# Patient Record
Sex: MALE | Race: WHITE | Employment: OTHER | URBAN - METROPOLITAN AREA
[De-identification: names, ages, dates, MRNs, and addresses within clinical notes are randomized per-mention and may not be internally consistent; named-entity substitution may affect disease eponyms.]

---

## 2022-06-18 ENCOUNTER — HOSPITAL ENCOUNTER (EMERGENCY)
Age: 22
Discharge: HOME | End: 2022-06-18
Attending: EMERGENCY MEDICINE
Payer: OTHER GOVERNMENT

## 2022-06-18 VITALS
TEMPERATURE: 99.5 F | OXYGEN SATURATION: 100 % | DIASTOLIC BLOOD PRESSURE: 81 MMHG | WEIGHT: 150 LBS | HEIGHT: 68 IN | BODY MASS INDEX: 22.73 KG/M2 | SYSTOLIC BLOOD PRESSURE: 130 MMHG | RESPIRATION RATE: 18 BRPM | HEART RATE: 110 BPM

## 2022-06-18 DIAGNOSIS — Z20.822 PERSON UNDER INVESTIGATION FOR COVID-19: ICD-10-CM

## 2022-06-18 DIAGNOSIS — J06.9 VIRAL UPPER RESPIRATORY TRACT INFECTION: Primary | ICD-10-CM

## 2022-06-18 LAB
FLUAV RNA SPEC QL NAA+PROBE: DETECTED
FLUBV RNA SPEC QL NAA+PROBE: NOT DETECTED
S PYO AG THROAT QL: NEGATIVE
SARS-COV-2, COV2: NOT DETECTED

## 2022-06-18 PROCEDURE — 87636 SARSCOV2 & INF A&B AMP PRB: CPT

## 2022-06-18 PROCEDURE — 74011250637 HC RX REV CODE- 250/637: Performed by: PHYSICIAN ASSISTANT

## 2022-06-18 PROCEDURE — 99283 EMERGENCY DEPT VISIT LOW MDM: CPT

## 2022-06-18 PROCEDURE — 87070 CULTURE OTHR SPECIMN AEROBIC: CPT

## 2022-06-18 PROCEDURE — 87880 STREP A ASSAY W/OPTIC: CPT

## 2022-06-18 RX ORDER — IBUPROFEN 600 MG/1
600 TABLET ORAL
Status: COMPLETED | OUTPATIENT
Start: 2022-06-18 | End: 2022-06-18

## 2022-06-18 RX ADMIN — IBUPROFEN 600 MG: 600 TABLET ORAL at 09:54

## 2022-06-18 NOTE — LETTER
[facility] [place]  [facility] EMERGENCY DEPT  [address]  [facility] NEWS [address]  [address] [phone]    Work/School Note    Date: 6/18/2022     To Whom It May concern:    Bridgett Osullivan was evaluated by the following provider(s):  Attending Provider: Helena Johnson MD  Physician Assistant: Kimberly Montes De Oca, [address] virus is suspected. Per the CDC guidelines we recommend home isolation until the following conditions are all met:    1. At least five days have passed since symptoms first appeared (6/23/22)  2. After home isolation for five days, wearing a mask around others for the next five days,  3. At least 24 have passed since last fever without the use of fever-reducing medications and  4.  Symptoms (eg cough, shortness of breath) have improved      Sincerely,          JUAN Centeno

## 2022-06-18 NOTE — ED PROVIDER NOTES
EMERGENCY DEPARTMENT HISTORY AND PHYSICAL EXAM    Date: 6/18/2022  Patient Name: Tricia Hamlin    History of Presenting Illness     Chief Complaint   Patient presents with    Generalized Body Aches    Fever    Cough         History Provided By: Patient    10:20 AM  Tricia Hamlin is a 25 y.o. male who presents to the emergency department C/O fever, chills, body aches, cough, mild sore throat which patient woke up this morning. He states he started feeling \"feverish last night\". No known sick contacts or exposure to COVID-19 but is in the [employer] and is often around other groups of people. He is he received both COVID vaccines over a year ago. His wife is currently pregnant prompting them to come to the ED for evaluation. He did not take any medications prior to arrival.  No other medical problems, non-smoker. Pt denies ear pain, difficulty swallowing, shortness of breath, and any other sxs or complaints. PCP: None        Past History     Past Medical History:  No past medical history on file. Past Surgical History:  No past surgical history on file. Family History:  No family history on file. Social History:  Social History     Tobacco Use    Smoking status: Not on file    Smokeless tobacco: Not on file   Substance Use Topics    Alcohol use: Not on file    Drug use: Not on file       Allergies: Allergies   Allergen Reactions    Other Plant, Animal, Environmental Itching     water         Review of Systems   Review of Systems   Constitutional: Positive for chills and fever. HENT: Positive for sore throat. Negative for ear pain and trouble swallowing. Respiratory: Positive for cough. Negative for shortness of breath. Musculoskeletal: Positive for myalgias. All other systems reviewed and are negative.         Physical Exam     Vitals:    06/18/22 0858   BP: 130/81   Pulse: (!) 134   Resp: 18   Temp: (!) 101.3 °F (38.5 °C)   SpO2: 100%   Weight: 68 kg (150 lb)   Height: 5' 8\" (1.727 m)     Physical Exam  Vital signs and nursing notes reviewed. CONSTITUTIONAL: Alert. Well-appearing; well-nourished; in no apparent distress. HEAD: Normocephalic; atraumatic. EYES: Conjunctiva clear. ENT: TM's normal. External ear normal. Normal nose; no rhinorrhea. Normal pharynx. Tonsils not enlarged without exudate. Moist mucus membranes. NECK: Supple; FROM without difficulty, non-tender; no cervical lymphadenopathy. CV: Mildly tachycardic, regular S1, S2; no murmurs, rubs, or gallops. RESPIRATORY: Normal chest excursion with respiration; breath sounds clear and equal bilaterally; no wheezes, rhonchi, or rales. NEURO: A & O x3. PSYCH:  Mood and affect appropriate. Diagnostic Study Results     Labs -     Recent Results (from the past 12 hour(s))   POC GROUP A STREP    Collection Time: 06/18/22 10:03 AM   Result Value Ref Range    Group A strep (POC) Negative NEG         Radiologic Studies -   No orders to display     CT Results  (Last 48 hours)    None        CXR Results  (Last 48 hours)    None          Medications given in the ED-  Medications   ibuprofen (MOTRIN) tablet 600 mg (600 mg Oral Given 6/18/22 0954)         Medical Decision Making   I am the first provider for this patient. I reviewed the vital signs, available nursing notes, past medical history, past surgical history, family history and social history. Vital Signs-Reviewed the patient's vital signs. Records Reviewed: Nursing Notes      Procedures:  Procedures    ED Course:  10:20 AM   Initial assessment performed. The patients presenting problems have been discussed, and they are in agreement with the care plan formulated and outlined with them. I have encouraged them to ask questions as they arise throughout their visit. Provider Notes (Medical Decision Making): Clara Phillips is a 25 y.o. male presents with URI symptoms onset this morning with associated fever.   Patient medicated in the ED, well-appearing with a normal exam other than a very mild tachycardia. Symptoms are consistent with a viral URI, no signs of secondary bacterial infection that would warrant antibiotics or further work-up at this time. His rapid strep is negative, COVID and flu test pending at this time. No comorbidities to warrant Tamiflu or COVID-specific antivirals, per current CDC guidelines. Recommend antipyretics, plenty of fluids, over-the-counter medications for symptomatic relief discussed as well as return precautions. Work note provided per current [employer]. Diagnosis and Disposition       DISCHARGE NOTE:    Elvira Alexander  results have been reviewed with him. He has been counseled regarding his diagnosis, treatment, and plan. He verbally conveys understanding and agreement of the signs, symptoms, diagnosis, treatment and prognosis and additionally agrees to follow up as discussed. He also agrees with the care-plan and conveys that all of his questions have been answered. I have also provided discharge instructions for him that include: educational information regarding their diagnosis and treatment, and list of reasons why they would want to return to the ED prior to their follow-up appointment, should his condition change. He has been provided with education for proper emergency department utilization. CLINICAL IMPRESSION:    1. Viral upper respiratory tract infection    2. Person under investigation for COVID-19        PLAN:  1. D/C Home  2. There are no discharge medications for this patient.     3.   Follow-up Information     Follow up With Specialties Details Why [address]   As needed [address] [place] [address] EMERGENCY DEPT Emergency Medicine  As needed, If symptoms worsen [address] [id number] [phone]        _______________________________      Please note that this dictation was completed with Dragon, the computer voice recognition software. Quite often unanticipated grammatical, syntax, homophones, and other interpretive errors are inadvertently transcribed by the computer software. Please disregard these errors. Please excuse any errors that have escaped final proofreading.

## 2022-06-20 LAB
BACTERIA SPEC CULT: NORMAL
SERVICE CMNT-IMP: NORMAL